# Patient Record
Sex: FEMALE | Race: WHITE | NOT HISPANIC OR LATINO | Employment: OTHER | URBAN - METROPOLITAN AREA
[De-identification: names, ages, dates, MRNs, and addresses within clinical notes are randomized per-mention and may not be internally consistent; named-entity substitution may affect disease eponyms.]

---

## 2020-07-27 ENCOUNTER — TELEPHONE (OUTPATIENT)
Dept: OBSTETRICS AND GYNECOLOGY | Facility: CLINIC | Age: 70
End: 2020-07-27

## 2020-07-27 NOTE — TELEPHONE ENCOUNTER
----- Message from Rivka Jauregui sent at 7/27/2020  9:20 AM CDT -----  Contact: pt  Patient need to change annual appt from aug 31st to sometime the week of 9/14 or after. Please call patient back at [phone]

## 2020-07-28 ENCOUNTER — TELEPHONE (OUTPATIENT)
Dept: OBSTETRICS AND GYNECOLOGY | Facility: CLINIC | Age: 70
End: 2020-07-28

## 2020-07-28 NOTE — TELEPHONE ENCOUNTER
LVM informing pt she could either call back to r/s her annual or she can schedule it via the [facility] where she cx her previous appt.

## 2020-10-29 ENCOUNTER — OFFICE VISIT (OUTPATIENT)
Dept: OBSTETRICS AND GYNECOLOGY | Facility: CLINIC | Age: 70
End: 2020-10-29
Payer: MEDICARE

## 2020-10-29 VITALS
SYSTOLIC BLOOD PRESSURE: 124 MMHG | HEIGHT: 62 IN | BODY MASS INDEX: 37.91 KG/M2 | WEIGHT: 206 LBS | HEART RATE: 70 BPM | DIASTOLIC BLOOD PRESSURE: 70 MMHG

## 2020-10-29 DIAGNOSIS — Z01.419 ENCOUNTER FOR ROUTINE GYNECOLOGIC EXAMINATION IN MEDICARE PATIENT: Primary | ICD-10-CM

## 2020-10-29 PROCEDURE — G0101 PR CA SCREEN;PELVIC/BREAST EXAM: ICD-10-PCS | Mod: S$GLB,,, | Performed by: OBSTETRICS & GYNECOLOGY

## 2020-10-29 PROCEDURE — G0101 CA SCREEN;PELVIC/BREAST EXAM: HCPCS | Mod: S$GLB,,, | Performed by: OBSTETRICS & GYNECOLOGY

## 2020-10-29 RX ORDER — ATORVASTATIN CALCIUM 20 MG/1
TABLET, FILM COATED ORAL
COMMUNITY
Start: 2020-10-14 | End: 2023-06-22

## 2020-10-29 RX ORDER — CEPHRADINE 250 MG
CAPSULE ORAL
COMMUNITY
End: 2022-11-16

## 2020-10-29 RX ORDER — INFLUENZA A VIRUS A/MICHIGAN/45/2015 X-275 (H1N1) ANTIGEN (FORMALDEHYDE INACTIVATED), INFLUENZA A VIRUS A/SINGAPORE/INFIMH-16-0019/2016 IVR-186 (H3N2) ANTIGEN (FORMALDEHYDE INACTIVATED), INFLUENZA B VIRUS B/PHUKET/3073/2013 ANTIGEN (FORMALDEHYDE INACTIVATED), AND INFLUENZA B VIRUS B/MARYLAND/15/2016 BX-69A ANTIGEN (FORMALDEHYDE INACTIVATED) 60; 60; 60; 60 UG/.7ML; UG/.7ML; UG/.7ML; UG/.7ML
INJECTION, SUSPENSION INTRAMUSCULAR
COMMUNITY
Start: 2020-09-20

## 2020-10-29 RX ORDER — ALLOPURINOL 300 MG/1
300 TABLET ORAL DAILY
COMMUNITY
Start: 2020-09-20

## 2020-10-29 RX ORDER — GABAPENTIN 300 MG/1
CAPSULE ORAL
COMMUNITY
Start: 2020-10-03

## 2020-10-29 RX ORDER — METOPROLOL TARTRATE 50 MG/1
TABLET ORAL
COMMUNITY
Start: 2020-09-30 | End: 2023-06-22

## 2020-10-29 RX ORDER — NATEGLINIDE 60 MG/1
TABLET ORAL
COMMUNITY
Start: 2020-10-03

## 2020-10-29 RX ORDER — MIRABEGRON 50 MG/1
TABLET, FILM COATED, EXTENDED RELEASE ORAL
COMMUNITY
Start: 2020-09-22 | End: 2022-11-16

## 2020-10-29 RX ORDER — CHOLECALCIFEROL (VITAMIN D3) 25 MCG
1000 TABLET ORAL DAILY
COMMUNITY

## 2020-10-29 RX ORDER — TRIAMTERENE AND HYDROCHLOROTHIAZIDE 37.5; 25 MG/1; MG/1
CAPSULE ORAL
COMMUNITY
Start: 2020-09-05

## 2020-10-29 RX ORDER — ZOLPIDEM TARTRATE 10 MG/1
TABLET ORAL
COMMUNITY
Start: 2020-09-21 | End: 2022-11-16

## 2020-10-29 RX ORDER — BIOTIN 10 MG
TABLET ORAL
COMMUNITY
End: 2022-11-16

## 2020-10-29 NOTE — PROGRESS NOTES
Kaylee Montero is a 70 y.o. female  who presents for a well woman exam.  She has no issues, problems, or complaints.    Past Medical History:   Diagnosis Date    Basal cell carcinoma (BCC) of scalp     Breast cancer, left     GERD (gastroesophageal reflux disease)     Gout     HTN (hypertension)     Hyperlipidemia     OAB (overactive bladder)        Past Surgical History:   Procedure Laterality Date    BASAL CELL CARCINOMA EXCISION      scalp    BILATERAL TUBAL LIGATION      BREAST LUMPECTOMY Left     with LND    BUNIONECTOMY  2018    COLONOSCOPY  2014    KNEE ARTHROSCOPY Left     LASIK         OB History    Para Term  AB Living   4 3           SAB TAB Ectopic Multiple Live Births                  # Outcome Date GA Lbr Declan/2nd Weight Sex Delivery Anes PTL Lv   4             3 Para            2 Para            1 Para                No family history on file.    Social History     Tobacco Use    Smoking status: Not on file   Substance Use Topics    Alcohol use: Not on file    Drug use: Not on file         Current Outpatient Medications:     biotin 10 mg Tab, Take by mouth., Disp: , Rfl:     omega 3-dha-epa-fish oil (FISH OIL) 120-180-500 mg Cap, Take by mouth., Disp: , Rfl:     vitamin D (VITAMIN D3) 1000 units Tab, Take 1,000 Units by mouth once daily., Disp: , Rfl:     allopurinoL (ZYLOPRIM) 300 MG tablet, Take 300 mg by mouth once daily., Disp: , Rfl:     atorvastatin (LIPITOR) 20 MG tablet, TK 1 T PO QPM WITH SUPPER, Disp: , Rfl:     FLUZONE HIGHDOSE QUAD 20-21  mcg/0.7 mL Syrg, PHARMACY ADMINISTERED, Disp: , Rfl:     gabapentin (NEURONTIN) 300 MG capsule, TK 1 C PO HS. MAY INCREASE TO 2 CS HS, Disp: , Rfl:     metoprolol tartrate (LOPRESSOR) 50 MG tablet, TK 1 T PO QD, Disp: , Rfl:     MYRBETRIQ 50 mg Tb24, TK 1 T PO QD, Disp: , Rfl:     nateglinide (STARLIX) 60 MG tablet, TK 1 T PO QD BEFORE HEAVIEST MEAL FOR DIABETES, Disp: , Rfl:      "triamterene-hydrochlorothiazide 37.5-25 mg (DYAZIDE) 37.5-25 mg per capsule, TK 1 C PO QD, Disp: , Rfl:     zolpidem (AMBIEN) 10 mg Tab, TK 1 T PO HS PRF SLP, Disp: , Rfl:      Review of patient's allergies indicates:   Allergen Reactions    Pcn [penicillins]     Sulfa (sulfonamide antibiotics)         ROS:  GENERAL: Denies weight gain or weight loss. Feeling well overall.   SKIN: Denies rash or lesions.   HEAD: Denies head injury or headache.   NODES: Denies enlarged lymph nodes.   CHEST: Denies shortness of breath.   CARDIOVASCULAR: Denies palpitations or chest pain.   ABDOMEN: Denies abdominal pain, constipation, diarrhea, nausea, vomiting or rectal bleeding.   URINARY: Denies frequency, dysuria, hematuria, or burning on urination.  REPRODUCTIVE: See HPI.   BREASTS: Denies pain, lumps, or nipple discharge.   HEMATOLOGIC: Denies easy bruisability or excessive bleeding.  MUSCULOSKELETAL: Denies joint pain or swelling.   NEUROLOGIC: Denies syncope or weakness.   PSYCHIATRIC: Denies depression, anxiety or mood swings.    PHYSICAL EXAM:    /70   Pulse 70   Ht 5' 2" (1.575 m)   Wt 93.4 kg (206 lb)   BMI 37.68 kg/m²    Body mass index is 37.68 kg/m².     APPEARANCE: Well nourished, well developed, in no acute distress.  AFFECT: WNL, alert and oriented x 3  SKIN: No acne or hirsutism  NECK: Neck symmetric without masses or thyromegaly  NODES: No inguinal, cervical, axillary, or femoral lymph node enlargement  CHEST: Good respiratory effect  ABDOMEN: Soft.  No tenderness or masses.  No hepatosplenomegaly.  No hernias.  BREASTS: Symmetrical, no skin changes or visible lesions.  No palpable masses, nipple discharge bilaterally.  PELVIC: Normal external genitalia without lesions.  Normal hair distribution.  Adequate perineal body, normal urethral meatus.  Urethra and bladder without tenderness or masses. Vagina moist and well rugated without lesions or discharge.  Cervix pink, without lesions, discharge or " tenderness.  No significant cystocele or rectocele.  Bimanual exam shows uterus to be normal size, regular, mobile and nontender.  Adnexa without masses or tenderness.    EXTREMITIES: No edema.     Encounter for routine gynecologic examination in Medicare patient         Patient was counseled today on A.C.S. Pap guidelines and recommendations for yearly pelvic exams, mammograms and monthly self breast exams; to see her PCP for other health maintenance.

## 2021-11-03 ENCOUNTER — TELEPHONE (OUTPATIENT)
Dept: OBSTETRICS AND GYNECOLOGY | Facility: CLINIC | Age: 71
End: 2021-11-03
Payer: MEDICARE

## 2021-12-08 ENCOUNTER — OFFICE VISIT (OUTPATIENT)
Dept: OBSTETRICS AND GYNECOLOGY | Facility: CLINIC | Age: 71
End: 2021-12-08
Payer: MEDICARE

## 2021-12-08 VITALS
SYSTOLIC BLOOD PRESSURE: 158 MMHG | DIASTOLIC BLOOD PRESSURE: 82 MMHG | WEIGHT: 210.38 LBS | BODY MASS INDEX: 38.48 KG/M2

## 2021-12-08 DIAGNOSIS — R19.4 INCREASED BOWEL FREQUENCY: Primary | ICD-10-CM

## 2021-12-08 PROCEDURE — 99213 PR OFFICE/OUTPT VISIT, EST, LEVL III, 20-29 MIN: ICD-10-PCS | Mod: S$GLB,,, | Performed by: OBSTETRICS & GYNECOLOGY

## 2021-12-08 PROCEDURE — 99213 OFFICE O/P EST LOW 20 MIN: CPT | Mod: S$GLB,,, | Performed by: OBSTETRICS & GYNECOLOGY

## 2022-04-14 DIAGNOSIS — N73.6 PELVIC ADHESIVE DISEASE: Primary | ICD-10-CM

## 2022-04-14 DIAGNOSIS — R10.2 PELVIC PAIN: ICD-10-CM

## 2022-04-14 NOTE — PROGRESS NOTES
Patient had surgery with Dr. Hoover showing severe pelvic adhesive disease involving adhesions from the intestine to the uterus, tubes and ovaries. He will be doing laparoscopic colectomy requiring hysterectomy at the same time.

## 2022-04-27 DIAGNOSIS — R10.2 PELVIC PAIN: ICD-10-CM

## 2022-04-27 DIAGNOSIS — N73.6 PELVIC ADHESIVE DISEASE: Primary | ICD-10-CM

## 2022-05-03 ENCOUNTER — OFFICE VISIT (OUTPATIENT)
Dept: OBSTETRICS AND GYNECOLOGY | Facility: CLINIC | Age: 72
End: 2022-05-03
Payer: MEDICARE

## 2022-05-03 VITALS
BODY MASS INDEX: 34.42 KG/M2 | WEIGHT: 188.19 LBS | DIASTOLIC BLOOD PRESSURE: 84 MMHG | SYSTOLIC BLOOD PRESSURE: 162 MMHG

## 2022-05-03 DIAGNOSIS — Z01.818 PREOP EXAMINATION: Primary | ICD-10-CM

## 2022-05-03 LAB
ANION GAP SERPL CALC-SCNC: 5 MMOL/L (ref 3–11)
APPEARANCE, UA: CLEAR
APTT PPP: 26.8 SEC (ref 22.6–35.4)
BASOPHILS NFR BLD: 0.7 % (ref 0–3)
BILIRUB UR QL STRIP: NEGATIVE MG/DL
BUN SERPL-MCNC: 11 MG/DL (ref 7–18)
BUN/CREAT SERPL: 11.82 RATIO (ref 7–18)
CALCIUM SERPL-MCNC: 9.6 MG/DL (ref 8.8–10.5)
CHLORIDE SERPL-SCNC: 102 MMOL/L (ref 100–108)
CO2 SERPL-SCNC: 37 MMOL/L (ref 21–32)
COLOR UR: NORMAL
CREAT SERPL-MCNC: 0.93 MG/DL (ref 0.55–1.02)
EOSINOPHIL NFR BLD: 0.8 % (ref 1–3)
ERYTHROCYTE [DISTWIDTH] IN BLOOD BY AUTOMATED COUNT: 13.7 % (ref 12.5–18)
GFR ESTIMATION: 59
GLUCOSE (UA): NORMAL MG/DL
GLUCOSE SERPL-MCNC: 111 MG/DL (ref 70–110)
HCT VFR BLD AUTO: 40.5 % (ref 37–47)
HGB BLD-MCNC: 13.2 G/DL (ref 12–16)
HGB UR QL STRIP: NEGATIVE /UL
INR PPP: 1.2 INR (ref 0.9–1.1)
KETONES UR QL STRIP: NEGATIVE MG/DL
LEUKOCYTE ESTERASE UR QL STRIP: NEGATIVE /UL
LYMPHOCYTES NFR BLD: 19.2 % (ref 25–40)
MCH RBC QN AUTO: 30.7 PG (ref 27–31.2)
MCHC RBC AUTO-ENTMCNC: 32.6 G/DL (ref 31.8–35.4)
MCV RBC AUTO: 94.2 FL (ref 80–97)
MONOCYTES NFR BLD: 7.6 % (ref 1–15)
NEUTROPHILS # BLD AUTO: 5.09 10*3/UL (ref 1.8–7.7)
NEUTROPHILS NFR BLD: 71.6 % (ref 37–80)
NITRITE UR QL STRIP: NEGATIVE
NUCLEATED RED BLOOD CELLS: 0 %
PH UR STRIP: 7 PH (ref 5–9)
PLATELETS: 268 10*3/UL (ref 142–424)
POTASSIUM SERPL-SCNC: 2.8 MMOL/L (ref 3.6–5.2)
PROT UR QL STRIP: NEGATIVE MG/DL
PROTHROMBIN TIME: 13 SEC (ref 10.2–12.4)
RBC # BLD AUTO: 4.3 10*6/UL (ref 4.2–5.4)
RPR: NON REACTIVE
SODIUM BLD-SCNC: 144 MMOL/L (ref 135–145)
SP GR UR STRIP: 1.01 (ref 1–1.03)
SPECIMEN COLLECTION METHOD, URINE: NORMAL
UROBILINOGEN UR STRIP-ACNC: NORMAL MG/DL
WBC # BLD: 7.1 10*3/UL (ref 4.6–10.2)

## 2022-05-03 PROCEDURE — 99499 NO LOS: ICD-10-PCS | Mod: S$GLB,,, | Performed by: OBSTETRICS & GYNECOLOGY

## 2022-05-03 PROCEDURE — 99499 UNLISTED E&M SERVICE: CPT | Mod: S$GLB,,, | Performed by: OBSTETRICS & GYNECOLOGY

## 2022-05-03 NOTE — PROGRESS NOTES
Kyalee Montero is a 71 y.o. female   For preoperative appointment for total laparoscopic hysterectomy, bilateral salpingo-oophorectomy, cystoscopy   on 22   with indication: pelvic adhesive disease    Past Medical History:   Diagnosis Date    Basal cell carcinoma (BCC) of scalp     Breast cancer, left     Diabetes     GERD (gastroesophageal reflux disease)     Gout     HTN (hypertension)     Hyperlipidemia     OAB (overactive bladder)        Past Surgical History:   Procedure Laterality Date    BASAL CELL CARCINOMA EXCISION      scalp    BILATERAL TUBAL LIGATION      BREAST LUMPECTOMY Left     with LND    BUNIONECTOMY  2018    COLONOSCOPY  2014    COLOSTOMY  2022    KNEE ARTHROSCOPY Left     LASIK         Social History     Socioeconomic History    Marital status:    Tobacco Use    Smoking status: Never Smoker    Smokeless tobacco: Never Used   Substance and Sexual Activity    Alcohol use: Yes    Drug use: Never    Sexual activity: Not Currently     Partners: Male         Current Outpatient Medications:     allopurinoL (ZYLOPRIM) 300 MG tablet, Take 300 mg by mouth once daily., Disp: , Rfl:     atorvastatin (LIPITOR) 20 MG tablet, TK 1 T PO QPM WITH SUPPER, Disp: , Rfl:     biotin 10 mg Tab, Take by mouth., Disp: , Rfl:     FLUZONE HIGHDOSE QUAD 20-21  mcg/0.7 mL Syrg, PHARMACY ADMINISTERED, Disp: , Rfl:     gabapentin (NEURONTIN) 300 MG capsule, TK 1 C PO HS. MAY INCREASE TO 2 CS HS, Disp: , Rfl:     metoprolol tartrate (LOPRESSOR) 50 MG tablet, TK 1 T PO QD, Disp: , Rfl:     MYRBETRIQ 50 mg Tb24, TK 1 T PO QD, Disp: , Rfl:     nateglinide (STARLIX) 60 MG tablet, TK 1 T PO QD BEFORE HEAVIEST MEAL FOR DIABETES, Disp: , Rfl:     omega 3-dha-epa-fish oil (FISH OIL) 120-180-500 mg Cap, Take by mouth., Disp: , Rfl:     triamterene-hydrochlorothiazide 37.5-25 mg (DYAZIDE) 37.5-25 mg per capsule, TK 1 C PO QD, Disp: , Rfl:     zolpidem (AMBIEN) 10 mg  Tab, TK 1 T PO HS PRF SLP, Disp: , Rfl:     potassium chloride SA (K-DUR,KLOR-CON) 20 MEQ tablet, Take 2 tablets (40 mEq total) by mouth once daily., Disp: 60 tablet, Rfl: 0    vitamin D (VITAMIN D3) 1000 units Tab, Take 1,000 Units by mouth once daily., Disp: , Rfl:      Review of patient's allergies indicates:   Allergen Reactions    Pcn [penicillins]     Sulfa (sulfonamide antibiotics)        ROS:  GENERAL: Denies weight gain or weight loss. Feeling well overall.   SKIN: Denies rash or lesions.   NODES: Denies enlarged lymph nodes.   CHEST: Denies shortness of breath.   CARDIOVASCULAR: Denies palpitations or chest pain.   ABDOMEN: Denies abdominal pain, constipation, diarrhea, nausea, vomiting or rectal bleeding.   URINARY: Denies frequency, dysuria, hematuria, or burning on urination.  REPRODUCTIVE: See HPI.   BREASTS: Denies pain, lumps, or nipple discharge.   HEMATOLOGIC: Denies easy bruisability or excessive bleeding with the exception of menstrual cycles.  PSYCHIATRIC: Denies depression, anxiety or mood swings.     PHYSICAL EXAM:  BP (!) 162/84   Wt 85.4 kg (188 lb 3.2 oz)   BMI 34.42 kg/m²    Body mass index is 34.42 kg/m².     APPEARANCE: Well nourished, well developed, in no acute distress.  AFFECT: WNL, alert and oriented x 3  SKIN: No acne or hirsutism  NECK: Neck symmetric without masses or thyromegaly  NODES: No inguinal, cervical, axillary, or femoral lymph node enlargement  CHEST: Good respiratory effect  ABDOMEN: Soft.  No tenderness or masses.  No hepatosplenomegaly.  No hernias.  BREASTS: Symmetrical, no skin changes or visible lesions.  No palpable masses, nipple discharge bilaterally.  PELVIC: Normal external genitalia without lesions.  Normal hair distribution.  Adequate perineal body, normal urethral meatus. Urethra and bladder without tenderness or masses.  Vagina moist and well rugated without lesions or discharge.  Cervix pink, without lesions, discharge or tenderness.  No  significant cystocele or rectocele.  Bimanual exam shows uterus to be normal size, regular, mobile and nontender.  Adnexa without masses or tenderness.    EXTREMITIES: No edema.     Anesthesia/Surgery risks, benefits and alternative options discussed and understood by patient/family.       Preop examination       Will proceed with TLH/BSO/cysto 5/9/22 with Dr. Hoover to do colectomy

## 2022-05-04 ENCOUNTER — TELEPHONE (OUTPATIENT)
Dept: OBSTETRICS AND GYNECOLOGY | Facility: CLINIC | Age: 72
End: 2022-05-04
Payer: MEDICARE

## 2022-05-04 DIAGNOSIS — E87.6 HYPOKALEMIA: Primary | ICD-10-CM

## 2022-05-04 RX ORDER — POTASSIUM CHLORIDE 20 MEQ/1
40 TABLET, EXTENDED RELEASE ORAL DAILY
Qty: 60 TABLET | Refills: 0 | Status: SHIPPED | OUTPATIENT
Start: 2022-05-04 | End: 2022-06-01 | Stop reason: SDUPTHER

## 2022-05-04 NOTE — TELEPHONE ENCOUNTER
Pt last seen 10/29/2020.   Pt requesting refill on Potassium CL 20MEQ ER.   Medication pending.   Pharmacy up to date.   Please advise.  Thank you!

## 2022-05-09 ENCOUNTER — OUTSIDE PLACE OF SERVICE (OUTPATIENT)
Dept: OBSTETRICS AND GYNECOLOGY | Facility: CLINIC | Age: 72
End: 2022-05-09

## 2022-05-09 ENCOUNTER — OUTSIDE PLACE OF SERVICE (OUTPATIENT)
Dept: OBSTETRICS AND GYNECOLOGY | Facility: CLINIC | Age: 72
End: 2022-05-09
Payer: MEDICARE

## 2022-05-09 LAB
GLUCOSE SERPL-MCNC: 116 MG/DL (ref 70–105)
GLUCOSE SERPL-MCNC: 117 MG/DL (ref 70–105)
GLUCOSE SERPL-MCNC: 160 MG/DL (ref 70–105)
POTASSIUM SERPL-SCNC: 3.2 MMOL/L (ref 3.6–5.2)

## 2022-05-09 PROCEDURE — 58571 TLH W/T/O 250 G OR LESS: CPT | Mod: ,,, | Performed by: OBSTETRICS & GYNECOLOGY

## 2022-05-09 PROCEDURE — 58571 PR LAPAROSCOPY W TOT HYSTERECTUTERUS <=250 GRAM  W TUBE/OVARY: ICD-10-PCS | Mod: ,,, | Performed by: OBSTETRICS & GYNECOLOGY

## 2022-05-09 PROCEDURE — 58571 TLH W/T/O 250 G OR LESS: CPT | Mod: 80,,, | Performed by: OBSTETRICS & GYNECOLOGY

## 2022-05-09 PROCEDURE — 58571 PR LAPAROSCOPY W TOT HYSTERECTUTERUS <=250 GRAM  W TUBE/OVARY: ICD-10-PCS | Mod: 80,,, | Performed by: OBSTETRICS & GYNECOLOGY

## 2022-05-10 LAB
ANION GAP SERPL CALC-SCNC: 8 MMOL/L (ref 3–11)
BANDS: 4 % (ref 0–5)
BUN SERPL-MCNC: 14 MG/DL (ref 7–18)
BUN/CREAT SERPL: 17.28 RATIO (ref 7–18)
CALCIUM SERPL-MCNC: 7.9 MG/DL (ref 8.8–10.5)
CELLS COUNTED: 100
CHLORIDE SERPL-SCNC: 104 MMOL/L (ref 100–108)
CO2 SERPL-SCNC: 30 MMOL/L (ref 21–32)
CREAT SERPL-MCNC: 0.81 MG/DL (ref 0.55–1.02)
ERYTHROCYTE [DISTWIDTH] IN BLOOD BY AUTOMATED COUNT: 13.9 % (ref 12.5–18)
GFR ESTIMATION: > 60
GLUCOSE SERPL-MCNC: 132 MG/DL (ref 70–110)
GLUCOSE SERPL-MCNC: 134 MG/DL (ref 70–105)
GLUCOSE SERPL-MCNC: 141 MG/DL (ref 70–105)
HCT VFR BLD AUTO: 35.8 % (ref 37–47)
HGB BLD-MCNC: 11.8 G/DL (ref 12–16)
LYMPHOCYTES NFR BLD: 8 % (ref 25–40)
MAGNESIUM SERPL-MCNC: 0.7 MG/DL (ref 1.8–2.4)
MCH RBC QN AUTO: 30.7 PG (ref 27–31.2)
MCHC RBC AUTO-ENTMCNC: 33 G/DL (ref 31.8–35.4)
MCV RBC AUTO: 93.2 FL (ref 80–97)
MONOCYTES NFR BLD: 3 % (ref 1–15)
NEUTROPHILS # BLD AUTO: 8.3 10*3/UL (ref 1.8–7.7)
NEUTROPHILS NFR BLD: 85 % (ref 37–80)
NUCLEATED RED BLOOD CELLS: 0 %
PLATELETS: 194 10*3/UL (ref 142–424)
POTASSIUM SERPL-SCNC: 3.5 MMOL/L (ref 3.6–5.2)
RBC # BLD AUTO: 3.84 10*6/UL (ref 4.2–5.4)
RBC MORPH BLD: ABNORMAL
SMALL PLATELETS BLD QL SMEAR: NORMAL
SODIUM BLD-SCNC: 142 MMOL/L (ref 135–145)
WBC # BLD: 9.3 10*3/UL (ref 4.6–10.2)

## 2022-05-11 LAB
GLUCOSE SERPL-MCNC: 107 MG/DL (ref 70–105)
GLUCOSE SERPL-MCNC: 124 MG/DL (ref 70–105)
GLUCOSE SERPL-MCNC: 145 MG/DL (ref 70–105)
GLUCOSE SERPL-MCNC: 158 MG/DL (ref 70–105)
GLUCOSE SERPL-MCNC: 215 MG/DL (ref 70–105)

## 2022-05-12 LAB
ANION GAP SERPL CALC-SCNC: 6 MMOL/L (ref 3–11)
BUN SERPL-MCNC: 8 MG/DL (ref 7–18)
BUN/CREAT SERPL: 10.66 RATIO (ref 7–18)
CALCIUM SERPL-MCNC: 7.9 MG/DL (ref 8.8–10.5)
CHLORIDE SERPL-SCNC: 106 MMOL/L (ref 100–108)
CO2 SERPL-SCNC: 29 MMOL/L (ref 21–32)
CREAT SERPL-MCNC: 0.75 MG/DL (ref 0.55–1.02)
GFR ESTIMATION: > 60
GLUCOSE SERPL-MCNC: 126 MG/DL (ref 70–110)
MAGNESIUM SERPL-MCNC: 1.6 MG/DL (ref 1.8–2.4)
POTASSIUM SERPL-SCNC: 3.8 MMOL/L (ref 3.6–5.2)
SODIUM BLD-SCNC: 141 MMOL/L (ref 135–145)

## 2022-05-16 ENCOUNTER — TELEPHONE (OUTPATIENT)
Dept: OBSTETRICS AND GYNECOLOGY | Facility: CLINIC | Age: 72
End: 2022-05-16
Payer: MEDICARE

## 2022-05-17 ENCOUNTER — TELEPHONE (OUTPATIENT)
Dept: OBSTETRICS AND GYNECOLOGY | Facility: CLINIC | Age: 72
End: 2022-05-17
Payer: MEDICARE

## 2022-05-17 NOTE — TELEPHONE ENCOUNTER
----- Message from Madison Pettit sent at 5/16/2022  1:11 PM CDT -----  Contact: self  Please call her at her home number [phone] around 330pm (when home health will be leaving).

## 2022-05-18 LAB — SPECIMEN TO PATHOLOGY: NORMAL

## 2022-05-19 ENCOUNTER — TELEPHONE (OUTPATIENT)
Dept: OBSTETRICS AND GYNECOLOGY | Facility: CLINIC | Age: 72
End: 2022-05-19
Payer: MEDICARE

## 2022-05-19 NOTE — TELEPHONE ENCOUNTER
----- Message from Evonne Cabral MA sent at 5/19/2022 10:19 AM CDT -----  Contact: PT  Mrs Page is confused on what to do. She said Dr. Villar doesn't want to remove her colostomy bag until she sees doctor at [facility]. She was wanting to know if you could talk to Dr. Villar to find out why he wants her to see doctor at [facility].  Evonne  ----- Message -----  From: Elisa Mason  Sent: 5/19/2022  10:11 AM CDT  To: Jerman Camacho Staff    .Type:  Needs Medical Advice    Who Called:  Kaylee     Would the patient rather a call back or a response via [facility]?  Callback    Best Call Back Number:  [phone] (home)      Additional Information: diagnosis/ procedure- suggested [facility]- getting ct scan and get bag removed// didn't know Dr Mg wanted to see pt and order test/ needed [facility] doctor name-  need clarification

## 2022-05-19 NOTE — TELEPHONE ENCOUNTER
Spoke with patient and referral sent for her to be evaluated while waiting for her colostomy reversal. She understands and agrees.

## 2022-05-26 ENCOUNTER — OFFICE VISIT (OUTPATIENT)
Dept: OBSTETRICS AND GYNECOLOGY | Facility: CLINIC | Age: 72
End: 2022-05-26
Payer: MEDICARE

## 2022-05-26 VITALS
DIASTOLIC BLOOD PRESSURE: 65 MMHG | HEART RATE: 99 BPM | HEIGHT: 62 IN | WEIGHT: 185 LBS | BODY MASS INDEX: 34.04 KG/M2 | SYSTOLIC BLOOD PRESSURE: 124 MMHG

## 2022-05-26 DIAGNOSIS — Z09 POSTOP CHECK: Primary | ICD-10-CM

## 2022-05-26 DIAGNOSIS — C54.1 ENDOMETRIAL CANCER: ICD-10-CM

## 2022-05-26 DIAGNOSIS — F32.A DEPRESSION, UNSPECIFIED DEPRESSION TYPE: ICD-10-CM

## 2022-05-26 PROCEDURE — 99499 NO LOS: ICD-10-PCS | Mod: S$GLB,,, | Performed by: OBSTETRICS & GYNECOLOGY

## 2022-05-26 PROCEDURE — 99499 UNLISTED E&M SERVICE: CPT | Mod: S$GLB,,, | Performed by: OBSTETRICS & GYNECOLOGY

## 2022-05-26 RX ORDER — SERTRALINE HYDROCHLORIDE 25 MG/1
25 TABLET, FILM COATED ORAL DAILY
Qty: 30 TABLET | Refills: 5 | Status: SHIPPED | OUTPATIENT
Start: 2022-05-26 | End: 2022-11-07 | Stop reason: SDUPTHER

## 2022-05-26 NOTE — PROGRESS NOTES
"CC: Postoperative visit    Kaylee Montero is a 72 y.o. female  presents for a postoperative visit s/p TLH/BSO/cysto on 22 after extensive lysis of adhesions and colectomy by Dr. Hoover. Pathology returned showing Stage 1 endometrial cancer and ruptured diverticulum.       ROS:  GENERAL: No fever, chills, fatigability or weight loss.  VULVAR: No pain, no lesions and no itching.  VAGINAL: No relaxation, no itching, no discharge, no abnormal bleeding and no lesions.  ABDOMEN: No abdominal pain. Denies nausea. Denies vomiting. No diarrhea. No constipation  URINARY: No incontinence, no nocturia, no frequency and no dysuria.  CARDIOVASCULAR: No chest pain. No shortness of breath. No leg cramps.  NEUROLOGICAL: No headaches. No vision changes.    PHYSICAL EXAM:  /65   Pulse 99   Ht 5' 2" (1.575 m)   Wt 83.9 kg (185 lb)   BMI 33.84 kg/m²    Body mass index is 33.84 kg/m².     INCISIONS: Intact with no erythema or drainage        Postop check    Endometrial cancer    Depression, unspecified depression type  -     sertraline (ZOLOFT) 25 MG tablet; Take 1 tablet (25 mg total) by mouth once daily.  Dispense: 30 tablet; Refill: 5    Discussed discussion with gyn onc that no further surgical staging needed at this time as there is no evidence of lymphvascular invasion and that she can proceed with colostomy reversal when appropriate. She will then see gyn onc for further recommendations.   Follow up in 4 weeks.        "

## 2022-06-01 ENCOUNTER — TELEPHONE (OUTPATIENT)
Dept: OBSTETRICS AND GYNECOLOGY | Facility: CLINIC | Age: 72
End: 2022-06-01
Payer: MEDICARE

## 2022-06-01 DIAGNOSIS — E87.6 HYPOKALEMIA: ICD-10-CM

## 2022-06-01 RX ORDER — POTASSIUM CHLORIDE 20 MEQ/1
TABLET, EXTENDED RELEASE ORAL
Qty: 180 TABLET | OUTPATIENT
Start: 2022-06-01

## 2022-06-01 RX ORDER — POTASSIUM CHLORIDE 20 MEQ/1
40 TABLET, EXTENDED RELEASE ORAL DAILY
Qty: 60 TABLET | Refills: 0 | Status: SHIPPED | OUTPATIENT
Start: 2022-06-01 | End: 2022-06-06 | Stop reason: SDUPTHER

## 2022-06-01 NOTE — TELEPHONE ENCOUNTER
Pt requesting refill on Potassium CL 20meq.   Medication pending.   Pharmacy up to date.   Please advise.  Thank you!

## 2022-06-01 NOTE — TELEPHONE ENCOUNTER
Please notify pt that prescription sent but have her do potassium level this week. Order sent to [facility]. Does not need to be drawn fasting.

## 2022-06-02 ENCOUNTER — TELEPHONE (OUTPATIENT)
Dept: OBSTETRICS AND GYNECOLOGY | Facility: CLINIC | Age: 72
End: 2022-06-02
Payer: MEDICARE

## 2022-06-02 NOTE — TELEPHONE ENCOUNTER
----- Message from Nils Coates sent at 6/2/2022 10:14 AM CDT -----  Contact: Patient  Patient need the nurse to call her regarding her [facility] Order.   Patient said she will go in a day or two to get her blood work done      Call back #  [phone]

## 2022-06-06 DIAGNOSIS — E87.6 HYPOKALEMIA: ICD-10-CM

## 2022-06-06 LAB — POTASSIUM: 3.5 MMOL/L (ref 3.5–5.1)

## 2022-06-06 RX ORDER — POTASSIUM CHLORIDE 20 MEQ/1
40 TABLET, EXTENDED RELEASE ORAL DAILY
Qty: 60 TABLET | Refills: 2 | Status: SHIPPED | OUTPATIENT
Start: 2022-06-06 | End: 2022-09-02

## 2022-06-27 ENCOUNTER — OFFICE VISIT (OUTPATIENT)
Dept: OBSTETRICS AND GYNECOLOGY | Facility: CLINIC | Age: 72
End: 2022-06-27
Payer: MEDICARE

## 2022-06-27 VITALS
SYSTOLIC BLOOD PRESSURE: 118 MMHG | DIASTOLIC BLOOD PRESSURE: 70 MMHG | BODY MASS INDEX: 34.13 KG/M2 | WEIGHT: 186.63 LBS

## 2022-06-27 DIAGNOSIS — Z09 POSTOP CHECK: Primary | ICD-10-CM

## 2022-06-27 DIAGNOSIS — C54.1 ENDOMETRIAL CANCER: ICD-10-CM

## 2022-06-27 PROCEDURE — 99499 NO LOS: ICD-10-PCS | Mod: S$GLB,,, | Performed by: OBSTETRICS & GYNECOLOGY

## 2022-06-27 PROCEDURE — 99499 UNLISTED E&M SERVICE: CPT | Mod: S$GLB,,, | Performed by: OBSTETRICS & GYNECOLOGY

## 2022-06-27 NOTE — PROGRESS NOTES
CC: Postoperative visit    Kaylee Montero is a 72 y.o. female  presents for a postoperative visit s/p TLH/BSO/cysto after extensive lysis of adhesions and colectomy on 22.  She was found to have a ruptured diverticulum and on path report stage 1 endometrial cancer without lymph vascular invasion.   She is doing well postoperative.    ROS:  GENERAL: No fever, chills, fatigability or weight loss.  VULVAR: No pain, no lesions and no itching.  VAGINAL: No relaxation, no itching, no discharge, no abnormal bleeding and no lesions.  ABDOMEN: No abdominal pain. Denies nausea. Denies vomiting. No diarrhea. No constipation  URINARY: No incontinence, no nocturia, no frequency and no dysuria.  CARDIOVASCULAR: No chest pain. No shortness of breath. No leg cramps.  NEUROLOGICAL: No headaches. No vision changes.    PHYSICAL EXAM:  /70   Wt 84.6 kg (186 lb 9.6 oz)   BMI 34.13 kg/m²    Body mass index is 34.13 kg/m².   INCISION: Intact with no erythema or drainage  PELVIC: Normal external genitalia without lesions.  Normal hair distribution.  Adequate perineal body, normal urethral meatus.  Vagina moist and well rugated without lesions or discharge.  Cervix pink, without lesions, discharge or tenderness.  No significant cystocele or rectocele.  Bimanual exam shows uterus to be normal size, regular, mobile and nontender.  Adnexa without masses or tenderness.          Postop check    Endometrial cancer       Will follow up after her colostomy reversal

## 2022-10-28 ENCOUNTER — TELEPHONE (OUTPATIENT)
Dept: OBSTETRICS AND GYNECOLOGY | Facility: CLINIC | Age: 72
End: 2022-10-28
Payer: MEDICARE

## 2022-10-28 NOTE — TELEPHONE ENCOUNTER
Returned call to patient. Spoke with patient about her concerns. Informed patient that Dr. Stoll's staff was gone for the day, but I would forward her message to Dr. Stoll. Patient verbalized understanding.  
none

## 2022-11-07 ENCOUNTER — TELEPHONE (OUTPATIENT)
Dept: OBSTETRICS AND GYNECOLOGY | Facility: CLINIC | Age: 72
End: 2022-11-07
Payer: MEDICARE

## 2022-11-07 DIAGNOSIS — F32.A DEPRESSION, UNSPECIFIED DEPRESSION TYPE: ICD-10-CM

## 2022-11-07 RX ORDER — SERTRALINE HYDROCHLORIDE 25 MG/1
25 TABLET, FILM COATED ORAL DAILY
Qty: 30 TABLET | Refills: 11 | Status: SHIPPED | OUTPATIENT
Start: 2022-11-07 | End: 2023-02-21 | Stop reason: SDUPTHER

## 2022-11-07 NOTE — TELEPHONE ENCOUNTER
----- Message from Louise Price sent at 11/7/2022 11:47 AM CST -----  Contact: Patient  Patient called to consult with nurse or staff regarding her zoloft medication. She states she wanted to discus this medication with the nurse and would like a call back. She can be reached at [phone]. Thanks/

## 2022-11-10 ENCOUNTER — TELEPHONE (OUTPATIENT)
Dept: OBSTETRICS AND GYNECOLOGY | Facility: CLINIC | Age: 72
End: 2022-11-10
Payer: MEDICARE

## 2022-11-10 NOTE — TELEPHONE ENCOUNTER
----- Message from Madison Pettit sent at 11/10/2022 11:12 AM CST -----  Contact: Morenita @ Dr Hoover's office  Type: Staff Message    Who called: Morenita @ Dr Hoover's office  Reason for the call:  Needs to coordinate surgery  Call back number: [phone]    Patient name and/or MRN: Kaylee EDIS Montero / [id number]   Additional information: n/a

## 2022-11-10 NOTE — TELEPHONE ENCOUNTER
Spoke with Morenita at Dr. Hoover's office. He is planning for repair of colovaginal fistula Dec 1. Notified her I would be available and will have pt come in next week to discuss.

## 2022-11-16 ENCOUNTER — OFFICE VISIT (OUTPATIENT)
Dept: OBSTETRICS AND GYNECOLOGY | Facility: CLINIC | Age: 72
End: 2022-11-16
Payer: MEDICARE

## 2022-11-16 VITALS
HEART RATE: 90 BPM | DIASTOLIC BLOOD PRESSURE: 77 MMHG | SYSTOLIC BLOOD PRESSURE: 138 MMHG | BODY MASS INDEX: 36.21 KG/M2 | WEIGHT: 198 LBS

## 2022-11-16 DIAGNOSIS — C54.1 ENDOMETRIAL CANCER: ICD-10-CM

## 2022-11-16 DIAGNOSIS — N82.4 COLOVAGINAL FISTULA: Primary | ICD-10-CM

## 2022-11-16 PROCEDURE — 99213 PR OFFICE/OUTPT VISIT, EST, LEVL III, 20-29 MIN: ICD-10-PCS | Mod: S$GLB,,, | Performed by: OBSTETRICS & GYNECOLOGY

## 2022-11-16 PROCEDURE — 99213 OFFICE O/P EST LOW 20 MIN: CPT | Mod: S$GLB,,, | Performed by: OBSTETRICS & GYNECOLOGY

## 2022-11-16 RX ORDER — LOSARTAN POTASSIUM 50 MG/1
50 TABLET ORAL DAILY
COMMUNITY
Start: 2022-11-10

## 2022-11-16 RX ORDER — LISINOPRIL 10 MG/1
TABLET ORAL
COMMUNITY

## 2022-11-16 RX ORDER — METFORMIN HYDROCHLORIDE 500 MG/1
500 TABLET ORAL 2 TIMES DAILY
COMMUNITY
Start: 2022-09-23

## 2022-11-16 RX ORDER — METOPROLOL SUCCINATE 50 MG/1
TABLET, EXTENDED RELEASE ORAL
COMMUNITY
End: 2023-06-22

## 2022-11-16 RX ORDER — HYDROGEN PEROXIDE 3 %
SOLUTION, NON-ORAL MISCELLANEOUS
COMMUNITY

## 2022-11-16 NOTE — PROGRESS NOTES
Chief Complaint: vaginal spotting     HPI:      Kaylee Montero is a 72 y.o. female  who presents complaining of vaginal spotting.  She had a total laparoscopic hysterectomy, bilateral salpingo-oophorectomy after extensive lysis of adhesion/bowel resection by Dr. Hoover on 22.  Pathology showed Stage 1 endometrial cancer. She will be going for further evaluation with Dr. Fu after colovaginal fistula repair is done 22 with Dr. Hoover.     Past Medical History:   Diagnosis Date    Basal cell carcinoma (BCC) of scalp     Breast cancer, left     Diabetes     Endometrial cancer 2022    GERD (gastroesophageal reflux disease)     Gout     HTN (hypertension)     Hyperlipidemia     OAB (overactive bladder)       Past Surgical History:   Procedure Laterality Date    BASAL CELL CARCINOMA EXCISION      scalp    BILATERAL TUBAL LIGATION      BREAST LUMPECTOMY Left     with LND    BUNIONECTOMY  2018    COLONOSCOPY  2014    COLOSTOMY  2022    KNEE ARTHROSCOPY Left     LASIK        Social History     Tobacco Use    Smoking status: Never    Smokeless tobacco: Never   Substance Use Topics    Alcohol use: Yes    Drug use: Never      Current Outpatient Medications on File Prior to Visit   Medication Sig Dispense Refill    allopurinoL (ZYLOPRIM) 300 MG tablet Take 300 mg by mouth once daily.      atorvastatin (LIPITOR) 20 MG tablet TK 1 T PO QPM WITH SUPPER      biotin 10 mg Tab Take by mouth.      FLUZONE HIGHDOSE QUAD 20-21  mcg/0.7 mL Syrg PHARMACY ADMINISTERED      gabapentin (NEURONTIN) 300 MG capsule TK 1 C PO HS. MAY INCREASE TO 2 CS HS      metoprolol tartrate (LOPRESSOR) 50 MG tablet TK 1 T PO QD      MYRBETRIQ 50 mg Tb24 TK 1 T PO QD      nateglinide (STARLIX) 60 MG tablet TK 1 T PO QD BEFORE HEAVIEST MEAL FOR DIABETES      omega 3-dha-epa-fish oil (FISH OIL) 120-180-500 mg Cap Take by mouth.      potassium chloride SA (K-DUR,KLOR-CON) 20 MEQ tablet TAKE 2 TABLETS(40 MEQ) BY MOUTH  EVERY DAY 60 tablet 2    sertraline (ZOLOFT) 25 MG tablet Take 1 tablet (25 mg total) by mouth once daily. 30 tablet 11    triamterene-hydrochlorothiazide 37.5-25 mg (DYAZIDE) 37.5-25 mg per capsule TK 1 C PO QD      vitamin D (VITAMIN D3) 1000 units Tab Take 1,000 Units by mouth once daily.      zolpidem (AMBIEN) 10 mg Tab TK 1 T PO HS PRF SLP       No current facility-administered medications on file prior to visit.      Review of patient's allergies indicates:   Allergen Reactions    Pcn [penicillins]     Sulfa (sulfonamide antibiotics)           ROS:     GENERAL: Denies weight gain or weight loss. Feeling well overall.   SKIN: Denies rash or lesions.   NODES: Denies enlarged lymph nodes.   CARDIOVASCULAR: Denies palpitations or chest pain.   ABDOMEN: Denies abdominal pain, constipation, diarrhea, nausea, vomiting or rectal bleeding.   URINARY: Denies frequency, dysuria, hematuria, or burning on urination.  REPRODUCTIVE: See HPI.   BREASTS: Denies pain, lumps, or nipple discharge.   HEMATOLOGIC: Denies easy bruisability or excessive bleeding   PSYCHIATRIC: Denies depression, anxiety or mood swings.   Physical Exam:      There were no vitals taken for this visit.  There is no height or weight on file to calculate BMI.     ABDOMEN: Soft.  No tenderness or masses. No hepatoslenomegaly. No hernias.   PELVIC: Normal external genitalia without lesions.  Normal hair distribution.  Adequate perineal body, normal urethral meatus.  Urethra and bladder without tenderness or masses. Vagina  with bloody discharge but fistula not visible and no stool noted    Assessment/Plan:     Colovaginal fistula    Endometrial cancer    Will be available for her repair 12/1/22 as previously discussed with Dr. Hoover's staff.

## 2023-02-14 ENCOUNTER — TELEPHONE (OUTPATIENT)
Dept: OBSTETRICS AND GYNECOLOGY | Facility: CLINIC | Age: 73
End: 2023-02-14
Payer: MEDICARE

## 2023-02-14 NOTE — TELEPHONE ENCOUNTER
----- Message from Miriam Rodriguez sent at 2/14/2023  1:15 PM CST -----  Contact: Kaylee Page needs a call back at [phone], Regards to providing the office an updated information  on her health.    Thanks  Td

## 2023-02-14 NOTE — TELEPHONE ENCOUNTER
----- Message from Miriam Rodriguez sent at 2/14/2023  1:15 PM CST -----  Contact: Kaylee Page needs a call back at [phone], Regards to providing the office an updated information  on her health.    Thanks  Td

## 2023-02-14 NOTE — TELEPHONE ENCOUNTER
Pt states they found another fistula in Jan and she will see David in April 12    She would like to see Dr Chamorro but she would like to speak to you regarding her health

## 2023-02-16 ENCOUNTER — TELEPHONE (OUTPATIENT)
Dept: OBSTETRICS AND GYNECOLOGY | Facility: CLINIC | Age: 73
End: 2023-02-16
Payer: MEDICARE

## 2023-02-21 ENCOUNTER — OFFICE VISIT (OUTPATIENT)
Dept: OBSTETRICS AND GYNECOLOGY | Facility: CLINIC | Age: 73
End: 2023-02-21
Payer: MEDICARE

## 2023-02-21 VITALS
BODY MASS INDEX: 35.15 KG/M2 | DIASTOLIC BLOOD PRESSURE: 74 MMHG | SYSTOLIC BLOOD PRESSURE: 122 MMHG | HEIGHT: 62 IN | WEIGHT: 191 LBS

## 2023-02-21 DIAGNOSIS — F32.A DEPRESSION, UNSPECIFIED DEPRESSION TYPE: ICD-10-CM

## 2023-02-21 DIAGNOSIS — E87.6 HYPOKALEMIA: ICD-10-CM

## 2023-02-21 PROCEDURE — 99213 OFFICE O/P EST LOW 20 MIN: CPT | Mod: S$GLB,,, | Performed by: OBSTETRICS & GYNECOLOGY

## 2023-02-21 PROCEDURE — 99213 PR OFFICE/OUTPT VISIT, EST, LEVL III, 20-29 MIN: ICD-10-PCS | Mod: S$GLB,,, | Performed by: OBSTETRICS & GYNECOLOGY

## 2023-02-21 RX ORDER — POTASSIUM CHLORIDE 20 MEQ/1
TABLET, EXTENDED RELEASE ORAL
Qty: 180 TABLET | Refills: 4 | Status: SHIPPED | OUTPATIENT
Start: 2023-02-21

## 2023-02-21 RX ORDER — SERTRALINE HYDROCHLORIDE 25 MG/1
25 TABLET, FILM COATED ORAL DAILY
Qty: 90 TABLET | Refills: 4 | Status: SHIPPED | OUTPATIENT
Start: 2023-02-21 | End: 2024-02-21

## 2023-02-21 NOTE — PROGRESS NOTES
Chief Complaint: follow up      HPI:      Kaylee Montero is a 72 y.o. female  who presents after fistula repair in December. She did well for a while and then noticed some increase in vaginal discharge. Imaging showed another small fistula which the surgeon is hoping will close off with time. She no longer notes any blood and has minimal discharge. She has another imaging study in March.  No LMP recorded. Patient is postmenopausal.    Past Medical History:   Diagnosis Date    Basal cell carcinoma (BCC) of scalp     Breast cancer, left     Diabetes     Endometrial cancer 2022    Stage 1    GERD (gastroesophageal reflux disease)     Gout     HTN (hypertension)     Hyperlipidemia     OAB (overactive bladder)       Past Surgical History:   Procedure Laterality Date    BASAL CELL CARCINOMA EXCISION      scalp    BILATERAL TUBAL LIGATION      BREAST LUMPECTOMY Left     with LND    BUNIONECTOMY      COLONOSCOPY  2014    COLOSTOMY  2022    HYSTERECTOMY, TOTAL, LAPAROSCOPIC, WITH SALPINGO-OOPHORECTOMY Bilateral 2022    with RUDDY, partial colectomy for ruptured diverticulum    KNEE ARTHROSCOPY Left     LASIK        Social History     Tobacco Use    Smoking status: Never    Smokeless tobacco: Never   Substance Use Topics    Alcohol use: Yes    Drug use: Never      Current Outpatient Medications on File Prior to Visit   Medication Sig Dispense Refill    allopurinoL (ZYLOPRIM) 300 MG tablet Take 300 mg by mouth once daily.      atorvastatin (LIPITOR) 20 MG tablet TK 1 T PO QPM WITH SUPPER      esomeprazole (NEXIUM) 20 MG capsule 1 cap(s)      FLUZONE HIGHDOSE QUAD 20-21  mcg/0.7 mL Syrg PHARMACY ADMINISTERED      gabapentin (NEURONTIN) 300 MG capsule TK 1 C PO HS. MAY INCREASE TO 2 CS HS      lisinopriL 10 MG tablet 1 tab(s)      losartan (COZAAR) 50 MG tablet Take 50 mg by mouth once daily.      metFORMIN (GLUCOPHAGE) 500 MG tablet Take 500 mg by mouth 2 (two) times daily.       "metoprolol succinate (TOPROL-XL) 50 MG 24 hr tablet 1 tab(s)      metoprolol tartrate (LOPRESSOR) 50 MG tablet TK 1 T PO QD      nateglinide (STARLIX) 60 MG tablet TK 1 T PO QD BEFORE HEAVIEST MEAL FOR DIABETES      triamterene-hydrochlorothiazide 37.5-25 mg (DYAZIDE) 37.5-25 mg per capsule TK 1 C PO QD      vitamin D (VITAMIN D3) 1000 units Tab Take 1,000 Units by mouth once daily.       No current facility-administered medications on file prior to visit.      Review of patient's allergies indicates:   Allergen Reactions    Pcn [penicillins]     Sulfa (sulfonamide antibiotics)           ROS:     GENERAL: Denies weight gain or weight loss. Feeling well overall.   SKIN: Denies rash or lesions.   NODES: Denies enlarged lymph nodes.   CARDIOVASCULAR: Denies palpitations or chest pain.   ABDOMEN: Denies abdominal pain, constipation, diarrhea, nausea, vomiting or rectal bleeding.   URINARY: Denies frequency, dysuria, hematuria, or burning on urination.  REPRODUCTIVE: See HPI.   BREASTS: Denies pain, lumps, or nipple discharge.   HEMATOLOGIC: Denies easy bruisability or excessive bleeding with the exception of menstrual cycles.  PSYCHIATRIC: Denies depression, anxiety or mood swings.   Physical Exam:      /74   Ht 5' 2" (1.575 m)   Wt 86.6 kg (191 lb)   BMI 34.93 kg/m²   Body mass index is 34.93 kg/m².     ABDOMEN: Soft.  No tenderness or masses. No hepatoslenomegaly. No hernias.   PELVIC: Normal external genitalia without lesions.  Normal hair distribution.  Adequate perineal body, normal urethral meatus.  Urethra and bladder without tenderness or masses. Vagina without lesions or discharge.  Bimanual exam shows adnexal areas without masses or tenderness.      Assessment/Plan:     Hypokalemia  -     potassium chloride SA (K-DUR,KLOR-CON) 20 MEQ tablet; TAKE 2 TABLETS(40 MEQ) BY MOUTH EVERY DAY  Dispense: 180 tablet; Refill: 4    Depression, unspecified depression type  -     sertraline (ZOLOFT) 25 MG tablet; " Take 1 tablet (25 mg total) by mouth once daily.  Dispense: 90 tablet; Refill: 4    Discussed consultation with Dr. Fu regarding Stage 1 endometrial CA at time of hysterectomy/colectomy for ruptured diverticulum in May 2022

## 2023-06-22 ENCOUNTER — OFFICE VISIT (OUTPATIENT)
Dept: OBSTETRICS AND GYNECOLOGY | Facility: CLINIC | Age: 73
End: 2023-06-22
Payer: MEDICARE

## 2023-06-22 VITALS
BODY MASS INDEX: 37.73 KG/M2 | WEIGHT: 205 LBS | HEART RATE: 76 BPM | HEIGHT: 62 IN | DIASTOLIC BLOOD PRESSURE: 74 MMHG | SYSTOLIC BLOOD PRESSURE: 132 MMHG

## 2023-06-22 DIAGNOSIS — N63.25 MASS OVERLAPPING MULTIPLE QUADRANTS OF LEFT BREAST: ICD-10-CM

## 2023-06-22 DIAGNOSIS — Z12.31 BREAST CANCER SCREENING BY MAMMOGRAM: Primary | ICD-10-CM

## 2023-06-22 DIAGNOSIS — Z01.419 ENCOUNTER FOR WELL WOMAN EXAM WITH ROUTINE GYNECOLOGICAL EXAM: ICD-10-CM

## 2023-06-22 DIAGNOSIS — C54.9 MALIGNANT NEOPLASM OF CORPUS UTERI, EXCEPT ISTHMUS: ICD-10-CM

## 2023-06-22 DIAGNOSIS — C50.012 MALIGNANT NEOPLASM OF NIPPLE OR AREOLA OF LEFT FEMALE BREAST: ICD-10-CM

## 2023-06-22 PROBLEM — C50.019 MALIGNANT NEOPLASM OF NIPPLE AND AREOLA OF FEMALE BREAST: Status: ACTIVE | Noted: 2023-06-22

## 2023-06-22 PROBLEM — Z93.3 COLOSTOMY PRESENT: Status: ACTIVE | Noted: 2023-06-22

## 2023-06-22 PROBLEM — M10.9 GOUT: Status: ACTIVE | Noted: 2023-06-22

## 2023-06-22 PROBLEM — G60.9 HEREDITARY AND IDIOPATHIC NEUROPATHY, UNSPECIFIED: Status: ACTIVE | Noted: 2023-06-22

## 2023-06-22 PROBLEM — K58.9 IRRITABLE BOWEL SYNDROME: Status: ACTIVE | Noted: 2023-06-22

## 2023-06-22 PROBLEM — K57.30 DIVERTICULAR DISEASE OF COLON: Status: ACTIVE | Noted: 2023-06-22

## 2023-06-22 PROBLEM — E11.9 TYPE 2 DIABETES MELLITUS WITHOUT COMPLICATION: Status: ACTIVE | Noted: 2023-06-22

## 2023-06-22 PROBLEM — I10 ESSENTIAL HYPERTENSION: Status: ACTIVE | Noted: 2023-06-22

## 2023-06-22 PROBLEM — N82.9: Status: ACTIVE | Noted: 2023-06-22

## 2023-06-22 PROBLEM — K21.00 GASTRO-ESOPHAGEAL REFLUX DISEASE WITH ESOPHAGITIS, WITHOUT BLEEDING: Status: ACTIVE | Noted: 2023-06-22

## 2023-06-22 PROCEDURE — 99214 OFFICE O/P EST MOD 30 MIN: CPT | Mod: S$GLB,,, | Performed by: OBSTETRICS & GYNECOLOGY

## 2023-06-22 PROCEDURE — 99214 PR OFFICE/OUTPT VISIT, EST, LEVL IV, 30-39 MIN: ICD-10-PCS | Mod: S$GLB,,, | Performed by: OBSTETRICS & GYNECOLOGY

## 2023-06-22 RX ORDER — METOPROLOL TARTRATE 25 MG/1
25 TABLET, FILM COATED ORAL
COMMUNITY
Start: 2023-02-15

## 2023-06-22 RX ORDER — LISINOPRIL 10 MG/1
TABLET ORAL
COMMUNITY
End: 2023-06-22

## 2023-06-22 RX ORDER — GABAPENTIN 100 MG/1
CAPSULE ORAL
COMMUNITY
End: 2023-06-22

## 2023-06-22 RX ORDER — HYDROGEN PEROXIDE 3 %
SOLUTION, NON-ORAL MISCELLANEOUS
COMMUNITY
End: 2023-06-22

## 2023-06-22 RX ORDER — TRIAMTERENE AND HYDROCHLOROTHIAZIDE 37.5; 25 MG/1; MG/1
CAPSULE ORAL
COMMUNITY

## 2023-06-22 RX ORDER — METOPROLOL SUCCINATE 50 MG/1
TABLET, EXTENDED RELEASE ORAL
COMMUNITY
End: 2023-06-22

## 2023-06-22 RX ORDER — SERTRALINE HYDROCHLORIDE 25 MG/1
TABLET, FILM COATED ORAL
COMMUNITY
Start: 2023-04-04 | End: 2023-06-22

## 2023-06-22 RX ORDER — ZOLPIDEM TARTRATE 5 MG/1
TABLET ORAL
COMMUNITY

## 2023-06-22 RX ORDER — METFORMIN HYDROCHLORIDE 500 MG/1
TABLET ORAL
COMMUNITY
End: 2023-06-22

## 2023-06-22 RX ORDER — SOLIFENACIN SUCCINATE 5 MG/1
5 TABLET, FILM COATED ORAL
COMMUNITY
Start: 2023-05-04

## 2023-06-22 RX ORDER — ATORVASTATIN CALCIUM 40 MG/1
TABLET, FILM COATED ORAL
COMMUNITY
Start: 2023-05-16

## 2023-06-22 NOTE — PROGRESS NOTES
CC:  NEW PT-followup endom ca (menop/ hyst with BSO)  Patient Care Team:  Danna Wang MD as PCP - General (Internal Medicine)  Kevin Skinner MD (Hematology and Oncology)  Matias Pickard MD (Oncology)  Raffaele Goldstein MD (Dermatology)  Keon Sims Jr., MD (Gastroenterology)  Haley Curry MD as Consulting Physician (Urology)  Anup Leonard MD (Colon and Rectal Surgery)    NEW PATIENT       HPI:  Patient is a 73 y.o. who presents for her followup today.  Patient's extensive medical history reviewed with patient. She is currently working on setting up surgery for her 3 rectovaginal fistulas that are continuing to be a problem. Has a colorectal surgeon in [place] who is scheduling her for surgery in the next few weeks. Has see Dr Hoover here and he did her original colostomy at time of hyst, but she plans to repair these fistulas and then eventually reverse the colostomy. She also was found to have endometrial cancer 2022 on pathology at time of hysterectomy. Dr magallon discussed with gyn onc and agreed likely no further treatment but ref to Dr Fu for further eval but pt has not set up. Discussed recommendation to follow up with gyn onc and will resend ref. Also with hx of breast ca and recently felt upper left breast felt lumpier than usual    Her hyst was for  endometrial ca  in   HRT:  CONTRAINDICATED  HX ABNL PAPS: none    REVIEW OF PRIOR DATA/ HEALTH MAINTENANCE:  LAST ANNUAL:   2021    LAST MMG (screening)- 2022- normal at [facility]- recently with specialist   LAST DEXA- not sure when but pcp orders and up to date    Past Medical History:   Diagnosis Date    Basal cell carcinoma (BCC) of scalp     Breast cancer, left     Depression 2022    Diabetes     Endometrial cancer 2022    Stage 1    GERD (gastroesophageal reflux disease)     Gout     HTN (hypertension)     Hyperlipidemia     OAB (overactive bladder)      SURGICAL HX:   has a past  surgical history that includes Bilateral tubal ligation; LASIK; Breast lumpectomy (Left); Knee arthroscopy (Left, 2015); Excision basal cell carcinoma (2015); Bunionectomy (2018); Colonoscopy (06/2014); Colostomy (04/2022); hysterectomy, total, laparoscopic, with salpingo-oophorectomy (Bilateral, 05/09/2022); Abdominal surgery (March 1, 2022); Colposcopy (January, 2022); Oophorectomy (May 9, 2022); Pelvic laparoscopy (March 1 & May 9, 2022); Tubal ligation (June, 1980); Colon surgery (March 1 & May 9, 2022); and Hysterectomy (May 9. 2022).    SOCIAL HX:    reports that she has never smoked. She has never used smokeless tobacco. She reports current alcohol use of about 8.0 standard drinks per week. She reports that she does not use drugs.    FAMILY HX:   family history includes Breast cancer in her maternal aunt; Cancer in her brother and maternal aunt; Diabetes in her father and paternal aunt; No Known Problems in her maternal grandfather, maternal grandmother, mother, paternal grandfather, paternal grandmother, and sister. .    ALLERGIES:  Pcn [penicillins] and Sulfa (sulfonamide antibiotics)    Current Outpatient Medications   Medication Instructions    allopurinoL (ZYLOPRIM) 300 mg, Oral, Daily    atorvastatin (LIPITOR) 40 MG tablet TAKE 1 TABLET BY MOUTH EVERY EVENING AT BEDTIME WITH SUPPER    esomeprazole (NEXIUM) 20 MG capsule 1 cap(s)    FLUZONE HIGHDOSE QUAD 20-21  mcg/0.7 mL Syrg PHARMACY ADMINISTERED    gabapentin (NEURONTIN) 300 MG capsule TK 1 C PO HS. MAY INCREASE TO 2 CS HS    lisinopriL 10 MG tablet 1 tab(s)    losartan (COZAAR) 50 mg, Oral, Daily    metFORMIN (GLUCOPHAGE) 500 mg, Oral, 2 times daily    metoprolol tartrate (LOPRESSOR) 25 mg, Oral    nateglinide (STARLIX) 60 MG tablet TK 1 T PO QD BEFORE HEAVIEST MEAL FOR DIABETES    potassium chloride SA (K-DUR,KLOR-CON) 20 MEQ tablet TAKE 2 TABLETS(40 MEQ) BY MOUTH EVERY DAY    sertraline (ZOLOFT) 25 mg, Oral, Daily    solifenacin (VESICARE) 5  "mg, Oral    triamterene-hydrochlorothiazide 37.5-25 mg (DYAZIDE) 37.5-25 mg per capsule TK 1 C PO QD    triamterene-hydrochlorothiazide 37.5-25 mg (DYAZIDE) 37.5-25 mg per capsule Dyazide    vitamin D (VITAMIN D3) 1,000 Units, Oral, Daily    zolpidem (AMBIEN) 5 MG Tab 1 tab(s) orally once a day (at bedtime)     ROS:  CONST:  No fever, chills, fatigue or unexpected changes in weight   CV: No chest pain or palpitations   RESP:  No shortness of breath or cough   GI: No abd pain, vomiting, diarrhea, blood in stool, or changes in bowel mvmts   SKIN: No rashes or lesions  MUSCULOSKELETAL: No joint swelling or pain   PSYCH: No changes in mood or insomia   BREASTS: No asymmetry, lumps, pain, nipple discharge, or skin changes   :  No dysuria, urgency, frequency, hematuria or incontinence           No vag dc, itching, odor or dryness           No pelvic pain, dyspareunia, or abnormal vaginal bleeding     VITALS:  Blood pressure 132/74, pulse 76, height 5' 2" (1.575 m), weight 93 kg (205 lb).  Body mass index is 37.49 kg/m².     PHYSICAL EXAM-  APPEARANCE: Well appearing, in no acute distress.   NECK: Neck symmetric   CV:  Normal rate   PULM: Normal resp rate, no resp distress, normal resp effort   PSYCH:  Normal mood and affect, cooperative   SKIN: No rashes, lesions, or abnormal bruising   LYMPH: No inguinal or axillary adenopathy   ABD: Soft, without tenderness or masses.    BREAST: Symmetrical, no nipple changes, no skin changes, No palpable masses   PELVIC:  VULVA: Normal female genitalia. No lesions.   URETHRAL MEATUS: No masses, no significant prolapse.  BLADDER/ URETHRA: No masses or suprapubic tenderness   VAGINA/ CUFF: No lesions. +atrophic changes. No discharge   PELVIS: No masses, tenderness, or fullness on bimanual exam   ANUS/ PERINEUM: Normal tone.  No lesions.     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 Reviewed calcium and vitamin D supplements and weight bearing exercise to minimize risks.  Encouraged patient to see her PCP for other health maintenance.

## 2023-08-07 ENCOUNTER — TELEPHONE (OUTPATIENT)
Dept: OBSTETRICS AND GYNECOLOGY | Facility: CLINIC | Age: 73
End: 2023-08-07
Payer: MEDICARE

## 2024-04-26 ENCOUNTER — TELEPHONE (OUTPATIENT)
Dept: OBSTETRICS AND GYNECOLOGY | Facility: CLINIC | Age: 74
End: 2024-04-26
Payer: MEDICARE

## 2024-04-26 NOTE — TELEPHONE ENCOUNTER
Attempted to contact patient, no answer. Left patient voice mail to call her pcp for Zoloft refills. Patient to call our office back as needed.

## 2024-04-26 NOTE — TELEPHONE ENCOUNTER
----- Message from Edwina Salcedo sent at 4/26/2024 11:17 AM CDT -----  Contact: Kaylee  Type:  RX Refill Request    Who Called: Kaylee  Refill or New Rx:refill  RX Name and Strength:sertraline (ZOLOFT) 25 MG tablet  How is the patient currently taking it? (ex. 1XDay):  Is this a 30 day or 90 day RX:  Preferred Pharmacy with phone number:  [id number] - [place], [place] - [address] AT [facility] [address]  [address]  [place] [address]  Phone: [phone] Fax: [phone]     Local or Mail Order:local  Ordering Provider:Jerman  Would the patient rather a call back or a response via [facility]? call  Best Call Back Number:[phone]   Additional Information: pt is out

## 2024-06-19 ENCOUNTER — TELEPHONE (OUTPATIENT)
Dept: OBSTETRICS AND GYNECOLOGY | Facility: CLINIC | Age: 74
End: 2024-06-19
Payer: MEDICARE

## 2024-10-08 ENCOUNTER — PATIENT MESSAGE (OUTPATIENT)
Dept: OBSTETRICS AND GYNECOLOGY | Facility: CLINIC | Age: 74
End: 2024-10-08
Payer: MEDICARE

## 2024-12-11 ENCOUNTER — DOCUMENTATION ONLY (OUTPATIENT)
Dept: OBSTETRICS AND GYNECOLOGY | Facility: CLINIC | Age: 74
End: 2024-12-11
Payer: MEDICARE